# Patient Record
Sex: MALE | Race: ASIAN | URBAN - METROPOLITAN AREA
[De-identification: names, ages, dates, MRNs, and addresses within clinical notes are randomized per-mention and may not be internally consistent; named-entity substitution may affect disease eponyms.]

---

## 2020-10-10 ENCOUNTER — EMERGENCY (EMERGENCY)
Facility: HOSPITAL | Age: 35
LOS: 1 days | Discharge: HOME | End: 2020-10-10
Attending: EMERGENCY MEDICINE
Payer: COMMERCIAL

## 2020-10-10 VITALS
TEMPERATURE: 98 F | HEART RATE: 68 BPM | SYSTOLIC BLOOD PRESSURE: 107 MMHG | RESPIRATION RATE: 18 BRPM | HEIGHT: 64 IN | WEIGHT: 171.96 LBS | DIASTOLIC BLOOD PRESSURE: 64 MMHG | OXYGEN SATURATION: 100 %

## 2020-10-10 PROCEDURE — 99282 EMERGENCY DEPT VISIT SF MDM: CPT

## 2020-10-10 RX ORDER — OLOPATADINE HYDROCHLORIDE 1 MG/ML
1 SOLUTION/ DROPS OPHTHALMIC
Qty: 5 | Refills: 0
Start: 2020-10-10 | End: 2020-10-14

## 2020-10-10 RX ORDER — KETOTIFEN FUMARATE 0.34 MG/ML
1 SOLUTION OPHTHALMIC
Qty: 5 | Refills: 0
Start: 2020-10-10 | End: 2020-10-14

## 2020-10-10 NOTE — ED PROVIDER NOTE - OBJECTIVE STATEMENT
34 y.o male with no PMhx and no PSHx presents to the ED c.o sudden redness and itchiness in both eyes after he came from outside into the house. Patient endorses he washed his eyes, it got worse, then it got better. Patient endorses some mild tearing. Patient denies anything flying into eye , trauma, burning or any other acute complaints. NKDA

## 2020-10-10 NOTE — ED ADULT NURSE NOTE - CAS ELECT INFOMATION PROVIDED
Patient seen, treated and released in intake. See stat docs. No nursing intervention required. Verbal instructions provided and verbalized understanding./DC instructions

## 2020-10-10 NOTE — ED PROVIDER NOTE - PATIENT PORTAL LINK FT
You can access the FollowMyHealth Patient Portal offered by [facility] by registering at the following website: [email or web address]. By joining [facility]’s FollowMyHealth portal, you will also be able to view your health information using other applications (apps) compatible with our system.